# Patient Record
Sex: MALE | Race: ASIAN | NOT HISPANIC OR LATINO | URBAN - METROPOLITAN AREA
[De-identification: names, ages, dates, MRNs, and addresses within clinical notes are randomized per-mention and may not be internally consistent; named-entity substitution may affect disease eponyms.]

---

## 2019-12-14 ENCOUNTER — INPATIENT (INPATIENT)
Facility: HOSPITAL | Age: 54
LOS: 3 days | Discharge: HOME | DRG: 378 | End: 2019-12-18
Attending: INTERNAL MEDICINE | Admitting: INTERNAL MEDICINE
Payer: COMMERCIAL

## 2019-12-14 VITALS
OXYGEN SATURATION: 98 % | SYSTOLIC BLOOD PRESSURE: 138 MMHG | WEIGHT: 160.06 LBS | HEIGHT: 62 IN | RESPIRATION RATE: 18 BRPM | DIASTOLIC BLOOD PRESSURE: 86 MMHG | HEART RATE: 89 BPM | TEMPERATURE: 98 F

## 2019-12-14 DIAGNOSIS — K92.2 GASTROINTESTINAL HEMORRHAGE, UNSPECIFIED: ICD-10-CM

## 2019-12-14 LAB
ALBUMIN SERPL ELPH-MCNC: 4 G/DL — SIGNIFICANT CHANGE UP (ref 3.3–5)
ALP SERPL-CCNC: 69 U/L — SIGNIFICANT CHANGE UP (ref 40–120)
ALT FLD-CCNC: 22 U/L — SIGNIFICANT CHANGE UP (ref 10–45)
ANION GAP SERPL CALC-SCNC: 10 MMOL/L — SIGNIFICANT CHANGE UP (ref 5–17)
APPEARANCE UR: CLEAR — SIGNIFICANT CHANGE UP
APTT BLD: 34.3 SEC — SIGNIFICANT CHANGE UP (ref 27.5–36.3)
AST SERPL-CCNC: 16 U/L — SIGNIFICANT CHANGE UP (ref 10–40)
BACTERIA # UR AUTO: 0 — SIGNIFICANT CHANGE UP
BASOPHILS # BLD AUTO: 0.04 K/UL — SIGNIFICANT CHANGE UP (ref 0–0.2)
BASOPHILS NFR BLD AUTO: 0.5 % — SIGNIFICANT CHANGE UP (ref 0–2)
BILIRUB SERPL-MCNC: 0.7 MG/DL — SIGNIFICANT CHANGE UP (ref 0.2–1.2)
BILIRUB UR-MCNC: NEGATIVE — SIGNIFICANT CHANGE UP
BLD GP AB SCN SERPL QL: NEGATIVE — SIGNIFICANT CHANGE UP
BUN SERPL-MCNC: 24 MG/DL — HIGH (ref 7–23)
CALCIUM SERPL-MCNC: 8 MG/DL — LOW (ref 8.4–10.5)
CHLORIDE SERPL-SCNC: 105 MMOL/L — SIGNIFICANT CHANGE UP (ref 96–108)
CO2 SERPL-SCNC: 24 MMOL/L — SIGNIFICANT CHANGE UP (ref 22–31)
COLOR SPEC: SIGNIFICANT CHANGE UP
CREAT SERPL-MCNC: 0.59 MG/DL — SIGNIFICANT CHANGE UP (ref 0.5–1.3)
DIFF PNL FLD: NEGATIVE — SIGNIFICANT CHANGE UP
EOSINOPHIL # BLD AUTO: 0.33 K/UL — SIGNIFICANT CHANGE UP (ref 0–0.5)
EOSINOPHIL NFR BLD AUTO: 4 % — SIGNIFICANT CHANGE UP (ref 0–6)
EPI CELLS # UR: 0 /HPF — SIGNIFICANT CHANGE UP
GAS PNL BLDV: SIGNIFICANT CHANGE UP
GLUCOSE SERPL-MCNC: 128 MG/DL — HIGH (ref 70–99)
GLUCOSE UR QL: ABNORMAL
HCT VFR BLD CALC: 31.9 % — LOW (ref 39–50)
HGB BLD-MCNC: 10.8 G/DL — LOW (ref 13–17)
HYALINE CASTS # UR AUTO: 1 /LPF — SIGNIFICANT CHANGE UP (ref 0–2)
IMM GRANULOCYTES NFR BLD AUTO: 0.2 % — SIGNIFICANT CHANGE UP (ref 0–1.5)
INR BLD: 1.04 RATIO — SIGNIFICANT CHANGE UP (ref 0.88–1.16)
KETONES UR-MCNC: NEGATIVE — SIGNIFICANT CHANGE UP
LEUKOCYTE ESTERASE UR-ACNC: NEGATIVE — SIGNIFICANT CHANGE UP
LIDOCAIN IGE QN: 17 U/L — SIGNIFICANT CHANGE UP (ref 7–60)
LYMPHOCYTES # BLD AUTO: 1.21 K/UL — SIGNIFICANT CHANGE UP (ref 1–3.3)
LYMPHOCYTES # BLD AUTO: 14.7 % — SIGNIFICANT CHANGE UP (ref 13–44)
MCHC RBC-ENTMCNC: 29.1 PG — SIGNIFICANT CHANGE UP (ref 27–34)
MCHC RBC-ENTMCNC: 33.9 GM/DL — SIGNIFICANT CHANGE UP (ref 32–36)
MCV RBC AUTO: 86 FL — SIGNIFICANT CHANGE UP (ref 80–100)
MONOCYTES # BLD AUTO: 0.64 K/UL — SIGNIFICANT CHANGE UP (ref 0–0.9)
MONOCYTES NFR BLD AUTO: 7.8 % — SIGNIFICANT CHANGE UP (ref 2–14)
NEUTROPHILS # BLD AUTO: 6.01 K/UL — SIGNIFICANT CHANGE UP (ref 1.8–7.4)
NEUTROPHILS NFR BLD AUTO: 72.8 % — SIGNIFICANT CHANGE UP (ref 43–77)
NITRITE UR-MCNC: NEGATIVE — SIGNIFICANT CHANGE UP
NRBC # BLD: 0 /100 WBCS — SIGNIFICANT CHANGE UP (ref 0–0)
OB PNL STL: POSITIVE
PH UR: 6 — SIGNIFICANT CHANGE UP (ref 5–8)
PLATELET # BLD AUTO: 188 K/UL — SIGNIFICANT CHANGE UP (ref 150–400)
POTASSIUM SERPL-MCNC: 3.3 MMOL/L — LOW (ref 3.5–5.3)
POTASSIUM SERPL-SCNC: 3.3 MMOL/L — LOW (ref 3.5–5.3)
PROT SERPL-MCNC: 6.3 G/DL — SIGNIFICANT CHANGE UP (ref 6–8.3)
PROT UR-MCNC: NEGATIVE — SIGNIFICANT CHANGE UP
PROTHROM AB SERPL-ACNC: 12 SEC — SIGNIFICANT CHANGE UP (ref 10–12.9)
RBC # BLD: 3.71 M/UL — LOW (ref 4.2–5.8)
RBC # FLD: 11.6 % — SIGNIFICANT CHANGE UP (ref 10.3–14.5)
RBC CASTS # UR COMP ASSIST: 1 /HPF — SIGNIFICANT CHANGE UP (ref 0–4)
RH IG SCN BLD-IMP: POSITIVE — SIGNIFICANT CHANGE UP
RH IG SCN BLD-IMP: POSITIVE — SIGNIFICANT CHANGE UP
SODIUM SERPL-SCNC: 139 MMOL/L — SIGNIFICANT CHANGE UP (ref 135–145)
SP GR SPEC: 1.03 — HIGH (ref 1.01–1.02)
UROBILINOGEN FLD QL: NEGATIVE — SIGNIFICANT CHANGE UP
WBC # BLD: 8.25 K/UL — SIGNIFICANT CHANGE UP (ref 3.8–10.5)
WBC # FLD AUTO: 8.25 K/UL — SIGNIFICANT CHANGE UP (ref 3.8–10.5)
WBC UR QL: 1 /HPF — SIGNIFICANT CHANGE UP (ref 0–5)

## 2019-12-14 PROCEDURE — 74177 CT ABD & PELVIS W/CONTRAST: CPT | Mod: 26

## 2019-12-14 PROCEDURE — 93010 ELECTROCARDIOGRAM REPORT: CPT

## 2019-12-14 PROCEDURE — 99284 EMERGENCY DEPT VISIT MOD MDM: CPT

## 2019-12-14 RX ORDER — PANTOPRAZOLE SODIUM 20 MG/1
40 TABLET, DELAYED RELEASE ORAL DAILY
Refills: 0 | Status: DISCONTINUED | OUTPATIENT
Start: 2019-12-14 | End: 2019-12-16

## 2019-12-14 RX ORDER — LEVOTHYROXINE SODIUM 125 MCG
88 TABLET ORAL DAILY
Refills: 0 | Status: DISCONTINUED | OUTPATIENT
Start: 2019-12-14 | End: 2019-12-18

## 2019-12-14 RX ORDER — LEVOTHYROXINE SODIUM 125 MCG
1 TABLET ORAL
Qty: 0 | Refills: 0 | DISCHARGE

## 2019-12-14 RX ORDER — LEVOTHYROXINE SODIUM 125 MCG
88 TABLET ORAL DAILY
Refills: 0 | Status: DISCONTINUED | OUTPATIENT
Start: 2019-12-14 | End: 2019-12-14

## 2019-12-14 RX ORDER — PANTOPRAZOLE SODIUM 20 MG/1
80 TABLET, DELAYED RELEASE ORAL ONCE
Refills: 0 | Status: COMPLETED | OUTPATIENT
Start: 2019-12-14 | End: 2019-12-14

## 2019-12-14 RX ORDER — POTASSIUM CHLORIDE 20 MEQ
40 PACKET (EA) ORAL ONCE
Refills: 0 | Status: COMPLETED | OUTPATIENT
Start: 2019-12-14 | End: 2019-12-14

## 2019-12-14 RX ADMIN — PANTOPRAZOLE SODIUM 80 MILLIGRAM(S): 20 TABLET, DELAYED RELEASE ORAL at 14:14

## 2019-12-14 RX ADMIN — Medication 40 MILLIEQUIVALENT(S): at 14:14

## 2019-12-14 NOTE — H&P ADULT - ASSESSMENT
5 y/o Yi speaking male with PMHx hypothyroidism no PSx       in  ED c/o epigastric pain/ melena,  x3 days s/p EGD/C-scope 4 days ago. by  an outside  jose  dr  . Patient reported ,  dark tarry stools after procedure/intermittent epigastric sharp burning pain  . Per  pt,  EGD/Colonoscopy,  nprmal per  pt  hb of  10    prbc   serial hb   gi  dr preston martinez   iv Protonix   ct  abdomen, pending  hypokalemia   dvt ppx/ PAS

## 2019-12-14 NOTE — ED ADULT NURSE REASSESSMENT NOTE - NS ED NURSE REASSESS COMMENT FT1
Pt resting comfortably in bed with wife at bedside. Denies pain, discomfort, dizziness, or nausea. Will continue to reassess.

## 2019-12-14 NOTE — H&P ADULT - NSHPLABSRESULTS_GEN_ALL_CORE
LABS:                        10.8   8.25  )-----------( 188      ( 14 Dec 2019 13:10 )             31.9         139  |  105  |  24<H>  ----------------------------<  128<H>  3.3<L>   |  24  |  0.59    Ca    8.0<L>      14 Dec 2019 13:10    TPro  6.3  /  Alb  4.0  /  TBili  0.7  /  DBili  x   /  AST  16  /  ALT  22  /  AlkPhos  69  12-14    PT/INR - ( 14 Dec 2019 13:10 )   PT: 12.0 sec;   INR: 1.04 ratio         PTT - ( 14 Dec 2019 13:10 )  PTT:34.3 sec      Urinalysis Basic - ( 14 Dec 2019 14:55 )    Color: Light Yellow / Appearance: Clear / S.026 / pH: x  Gluc: x / Ketone: Negative  / Bili: Negative / Urobili: Negative   Blood: x / Protein: Negative / Nitrite: Negative   Leuk Esterase: Negative / RBC: 1 /hpf / WBC 1 /HPF   Sq Epi: x / Non Sq Epi: 0 /hpf / Bacteria: 0.0           @ 13:10  3.1  36

## 2019-12-14 NOTE — CONSULT NOTE ADULT - SUBJECTIVE AND OBJECTIVE BOX
Chief Complaint:  Patient is a 54y old  Male who presents with a chief complaint of melena 53 y/o Indonesian speaking male with PMHx hypothyroidism no PSx   now presenting to the ED c/o epigastric pain and melanous stools x3 days s/p EGD/C-scope 4 days ago  . Patient reported ,  dark tarry stools after the procedure and intermittent epigastric sharp burning pain  . Patient rated abdominal pain 7/10 with onset and well localized. Patient reported EGD/C-scope within normal limits. Patient denied CP, SOB, N/V/D, fever chills, back pain, neck pain, dizziness, palpitations, weakness, h/o GI bleed, pepto bismol use, increased NSAID use, ETOH use   . Patient seen by GI doctor yesterday who "prescribed one month course of medication" but unknown meds         Review of Systems:  Review of Systems: REVIEW OF SYSTEMS:  GEN: no fever,    no chills  RESP: no SOB,   no cough  CVS: no chest pain,   no palpitations  GI: no abdominal pain,   no nausea,   no vomiting,   no constipation,   no diarrhea  : no dysuria,   no frequency  NEURO: no headache,   no dizziness  PSYCH: no depression,   not anxious Derm : no rash	      Allergies:  No Known Allergies      Medications:  levothyroxine 88 MICROGram(s) Oral daily  pantoprazole  Injectable 40 milliGRAM(s) IV Push daily      PMHX/PSHX:  Hypothyroidism  No significant past surgical history      Family history:  no uc no cd     Social History: no etoh  no cigs no ivda    ROS:     General:  No wt loss, fevers, chills, night sweats, fatigue,   Eyes:  Good vision, no reported pain  ENT:  No sore throat, pain, runny nose, dysphagia  CV:  No pain, palpitations, hypo/hypertension  Resp:  No dyspnea, cough, tachypnea, wheezing  GI:  No pain, No nausea, No vomiting, No diarrhea, No constipation, No weight loss, No fever, No pruritis, No rectal bleeding, No tarry stools, No dysphagia,  :  No pain, bleeding, incontinence, nocturia  Muscle:  No pain, weakness  Neuro:  No weakness, tingling, memory problems  Psych:  No fatigue, insomnia, mood problems, depression  Endocrine:  No polyuria, polydipsia, cold/heat intolerance  Heme:  No petechiae, ecchymosis, easy bruisability  Skin:  No rash, tattoos, scars, edema      PHYSICAL EXAM:   Vital Signs:  Vital Signs Last 24 Hrs  T(C): 37 (14 Dec 2019 21:51), Max: 37 (14 Dec 2019 21:51)  T(F): 98.6 (14 Dec 2019 21:51), Max: 98.6 (14 Dec 2019 21:51)  HR: 68 (14 Dec 2019 21:51) (68 - 89)  BP: 103/65 (14 Dec 2019 21:51) (103/65 - 138/86)  BP(mean): --  RR: 18 (14 Dec 2019 21:51) (18 - 20)  SpO2: 98% (14 Dec 2019 21:51) (96% - 98%)  Daily Height in cm: 160.02 (14 Dec 2019 16:58)    Daily     GENERAL:  Appears stated age, well-groomed, well-nourished, no distress  HEENT:  NC/AT,  conjunctivae clear and pink, no thyromegaly, nodules, adenopathy, no JVD, sclera -anicteric  CHEST:  Full & symmetric excursion, no increased effort, breath sounds clear  HEART:  Regular rhythm, S1, S2, no murmur/rub/S3/S4, no abdominal bruit, no edema  ABDOMEN:  Soft, non-tender, non-distended, normoactive bowel sounds,  no masses ,no hepato-splenomegaly, no signs of chronic liver disease  EXTEREMITIES:  no cyanosis,clubbing or edema  SKIN:  No rash/erythema/ecchymoses/petechiae/wounds/abscess/warm/dry  NEURO:  Alert, oriented, no asterixis, no tremor, no encephalopathy    LABS:                        10.8   8.25  )-----------( 188      ( 14 Dec 2019 13:10 )             31.9     12-14    139  |  105  |  24<H>  ----------------------------<  128<H>  3.3<L>   |  24  |  0.59    Ca    8.0<L>      14 Dec 2019 13:10    TPro  6.3  /  Alb  4.0  /  TBili  0.7  /  DBili  x   /  AST  16  /  ALT  22  /  AlkPhos  69  12-14    LIVER FUNCTIONS - ( 14 Dec 2019 13:10 )  Alb: 4.0 g/dL / Pro: 6.3 g/dL / ALK PHOS: 69 U/L / ALT: 22 U/L / AST: 16 U/L / GGT: x           PT/INR - ( 14 Dec 2019 13:10 )   PT: 12.0 sec;   INR: 1.04 ratio         PTT - ( 14 Dec 2019 13:10 )  PTT:34.3 sec  Urinalysis Basic - ( 14 Dec 2019 14:55 )    Color: Light Yellow / Appearance: Clear / S.026 / pH: x  Gluc: x / Ketone: Negative  / Bili: Negative / Urobili: Negative   Blood: x / Protein: Negative / Nitrite: Negative   Leuk Esterase: Negative / RBC: 1 /hpf / WBC 1 /HPF   Sq Epi: x / Non Sq Epi: 0 /hpf / Bacteria: 0.0      Amylase Serum--      Lipase serum17       Ammonia--      Imaging:

## 2019-12-14 NOTE — ED PROVIDER NOTE - OBJECTIVE STATEMENT
55 y/o Greek speaking male with PMHx hypothyroidism no PSx now presenting to the ED c/o epigastric pain and melanous stools x3 days s/p EGD/C-scope 4 days ago. Patient reported watery dark tarry stools after the procedure and intermittent epigastric sharp burning pain. Patient rated abdominal pain 7/10 with onset and well localized. Patient reported EGD/C-scope within normal limits. Patient denied CP, SOB, N/V/D, fever chills, back pain, neck pain, dizziness, palpitations, weakness, h/o GI bleed, pepto bismol use, increased NSAID use, ETOH use . Patient seen by GI doctor yesterday who "prescribed one month course of medication" but unknown meds    Greek  Xiomara: [id number]

## 2019-12-14 NOTE — H&P ADULT - NSHPPHYSICALEXAM_GEN_ALL_CORE
PHYSICAL EXAMINATION:  Vital Signs Last 24 Hrs  T(C): 36.3 (14 Dec 2019 16:58), Max: 36.7 (14 Dec 2019 11:21)  T(F): 97.4 (14 Dec 2019 16:58), Max: 98 (14 Dec 2019 11:21)  HR: 71 (14 Dec 2019 16:58) (71 - 89)  BP: 106/64 (14 Dec 2019 16:58) (106/64 - 138/86)  BP(mean): --  RR: 18 (14 Dec 2019 16:58) (18 - 20)  SpO2: 97% (14 Dec 2019 16:58) (96% - 98%)  CAPILLARY BLOOD GLUCOSE            GENERAL: NAD, well-groomed,  HEAD:  atraumatic, normocephalic  EYES: sclera anicteric  ENMT: mucous membranes moist  NECK: supple, No JVD  CHEST/LUNG: clear to auscultation bilaterally;    no      rales   ,   no rhonchi,   HEART: normal S1, S2  ABDOMEN: BS+, soft, ND, NT   EXTREMITIES:    no    edema    b/l LEs  NEURO: awake, ,     moves all extremities  SKIN: no     rash

## 2019-12-14 NOTE — ED ADULT NURSE NOTE - OBJECTIVE STATEMENT
55 y/o male c/o melena since Thursday AM. Pt states he went for endoscopy and colonoscopy on Wednesday at 1700 because he has been getting chronic diarrhea after eating. Pt says he has sharp, burning epigastric pain. No significant PMH. Denies N/V/D, constipation, blood in urine, urinary symptoms, headache, chest pain, dizziness, SOB, weakness, syncope, fever, chills, cough. A&Ox4, breath sounds clear bilaterally, no abd distention or tenderness, no edema, ambulatory and able to move all extremities, skin warm dry and intact. IV placed and labs drawn. Fecal occult to be performed. Will continue to reassess. 55 y/o male c/o melena since Thursday AM. Pt states he went for endoscopy and colonoscopy on Wednesday at 1700 because he has been getting chronic diarrhea after eating. Pt says he has sharp, burning epigastric pain. PMH of hypothyroidism. Denies N/V/D, constipation, blood in urine, urinary symptoms, headache, chest pain, dizziness, SOB, weakness, syncope, fever, chills, cough. A&Ox4, breath sounds clear bilaterally, no abd distention or tenderness, no edema, ambulatory and able to move all extremities, skin warm dry and intact, skin color congruent w/ ethnicity. IV placed and labs drawn. Fecal occult to be performed. Will continue to reassess. 53 y/o male c/o black stools since Thursday AM. Pt states he went for endoscopy and colonoscopy on Wednesday at 1700 because he has been getting chronic diarrhea after eating. Pt says he has sharp, burning epigastric pain. PMH of hypothyroidism. Denies N/V/D, constipation, blood in urine, urinary symptoms, headache, chest pain, dizziness, SOB, weakness, syncope, fever, chills, cough. A&Ox4, breath sounds clear bilaterally, no abd distention or tenderness, no edema, ambulatory and able to move all extremities, skin warm dry and intact, skin color congruent w/ ethnicity. IV placed and labs drawn. Fecal occult to be performed. Will continue to reassess.

## 2019-12-14 NOTE — H&P ADULT - HISTORY OF PRESENT ILLNESS
55 y/o Welsh speaking male with PMHx hypothyroidism no PSx   now presenting to the ED c/o epigastric pain and melanous stools x3 days s/p EGD/C-scope 4 days ago  . Patient reported ,  dark tarry stools after the procedure and intermittent epigastric sharp burning pain  . Patient rated abdominal pain 7/10 with onset and well localized. Patient reported EGD/C-scope within normal limits. Patient denied CP, SOB, N/V/D, fever chills, back pain, neck pain, dizziness, palpitations, weakness, h/o GI bleed, pepto bismol use, increased NSAID use, ETOH use   . Patient seen by GI doctor yesterday who "prescribed one month course of medication" but unknown meds

## 2019-12-14 NOTE — ED ADULT NURSE NOTE - NSIMPLEMENTINTERV_GEN_ALL_ED
Implemented All Universal Safety Interventions:  [place] to call system. Call bell, personal items and telephone within reach. Instruct patient to call for assistance. Room bathroom lighting operational. Non-slip footwear when patient is off stretcher. Physically safe environment: no spills, clutter or unnecessary equipment. Stretcher in lowest position, wheels locked, appropriate side rails in place.

## 2019-12-14 NOTE — CONSULT NOTE ADULT - ASSESSMENT
ugib/lgib  s/p scopes    plan    ppi once a day   check cbc  trasnfujse as needed  clears for now   to follow if bleeding persists may need  upper gastrointestinal endoscopy to be done  to d/w medicine team

## 2019-12-15 LAB
ANION GAP SERPL CALC-SCNC: 12 MMOL/L — SIGNIFICANT CHANGE UP (ref 5–17)
BUN SERPL-MCNC: 17 MG/DL — SIGNIFICANT CHANGE UP (ref 7–23)
CALCIUM SERPL-MCNC: 8.1 MG/DL — LOW (ref 8.4–10.5)
CHLORIDE SERPL-SCNC: 101 MMOL/L — SIGNIFICANT CHANGE UP (ref 96–108)
CO2 SERPL-SCNC: 24 MMOL/L — SIGNIFICANT CHANGE UP (ref 22–31)
CREAT SERPL-MCNC: 0.65 MG/DL — SIGNIFICANT CHANGE UP (ref 0.5–1.3)
GLUCOSE SERPL-MCNC: 119 MG/DL — HIGH (ref 70–99)
HCT VFR BLD CALC: 31.1 % — LOW (ref 39–50)
HCT VFR BLD CALC: 31.6 % — LOW (ref 39–50)
HCT VFR BLD CALC: 31.8 % — LOW (ref 39–50)
HCT VFR BLD CALC: 33.4 % — LOW (ref 39–50)
HCV AB S/CO SERPL IA: 0.1 S/CO — SIGNIFICANT CHANGE UP (ref 0–0.99)
HCV AB SERPL-IMP: SIGNIFICANT CHANGE UP
HGB BLD-MCNC: 10.5 G/DL — LOW (ref 13–17)
HGB BLD-MCNC: 10.9 G/DL — LOW (ref 13–17)
HGB BLD-MCNC: 10.9 G/DL — LOW (ref 13–17)
HGB BLD-MCNC: 11.6 G/DL — LOW (ref 13–17)
MCHC RBC-ENTMCNC: 28.9 PG — SIGNIFICANT CHANGE UP (ref 27–34)
MCHC RBC-ENTMCNC: 29.3 PG — SIGNIFICANT CHANGE UP (ref 27–34)
MCHC RBC-ENTMCNC: 29.3 PG — SIGNIFICANT CHANGE UP (ref 27–34)
MCHC RBC-ENTMCNC: 29.7 PG — SIGNIFICANT CHANGE UP (ref 27–34)
MCHC RBC-ENTMCNC: 33.8 GM/DL — SIGNIFICANT CHANGE UP (ref 32–36)
MCHC RBC-ENTMCNC: 34.3 GM/DL — SIGNIFICANT CHANGE UP (ref 32–36)
MCHC RBC-ENTMCNC: 34.5 GM/DL — SIGNIFICANT CHANGE UP (ref 32–36)
MCHC RBC-ENTMCNC: 34.7 GM/DL — SIGNIFICANT CHANGE UP (ref 32–36)
MCV RBC AUTO: 84.9 FL — SIGNIFICANT CHANGE UP (ref 80–100)
MCV RBC AUTO: 85.5 FL — SIGNIFICANT CHANGE UP (ref 80–100)
MCV RBC AUTO: 85.6 FL — SIGNIFICANT CHANGE UP (ref 80–100)
MCV RBC AUTO: 85.7 FL — SIGNIFICANT CHANGE UP (ref 80–100)
NRBC # BLD: 0 /100 WBCS — SIGNIFICANT CHANGE UP (ref 0–0)
PLATELET # BLD AUTO: 172 K/UL — SIGNIFICANT CHANGE UP (ref 150–400)
PLATELET # BLD AUTO: 178 K/UL — SIGNIFICANT CHANGE UP (ref 150–400)
PLATELET # BLD AUTO: 181 K/UL — SIGNIFICANT CHANGE UP (ref 150–400)
PLATELET # BLD AUTO: 183 K/UL — SIGNIFICANT CHANGE UP (ref 150–400)
POTASSIUM SERPL-MCNC: 3.3 MMOL/L — LOW (ref 3.5–5.3)
POTASSIUM SERPL-SCNC: 3.3 MMOL/L — LOW (ref 3.5–5.3)
RBC # BLD: 3.63 M/UL — LOW (ref 4.2–5.8)
RBC # BLD: 3.72 M/UL — LOW (ref 4.2–5.8)
RBC # BLD: 3.72 M/UL — LOW (ref 4.2–5.8)
RBC # BLD: 3.9 M/UL — LOW (ref 4.2–5.8)
RBC # FLD: 12.2 % — SIGNIFICANT CHANGE UP (ref 10.3–14.5)
RBC # FLD: 12.3 % — SIGNIFICANT CHANGE UP (ref 10.3–14.5)
RBC # FLD: 12.4 % — SIGNIFICANT CHANGE UP (ref 10.3–14.5)
RBC # FLD: 12.8 % — SIGNIFICANT CHANGE UP (ref 10.3–14.5)
SODIUM SERPL-SCNC: 137 MMOL/L — SIGNIFICANT CHANGE UP (ref 135–145)
WBC # BLD: 10.2 K/UL — SIGNIFICANT CHANGE UP (ref 3.8–10.5)
WBC # BLD: 9.09 K/UL — SIGNIFICANT CHANGE UP (ref 3.8–10.5)
WBC # BLD: 9.37 K/UL — SIGNIFICANT CHANGE UP (ref 3.8–10.5)
WBC # BLD: 9.73 K/UL — SIGNIFICANT CHANGE UP (ref 3.8–10.5)
WBC # FLD AUTO: 10.2 K/UL — SIGNIFICANT CHANGE UP (ref 3.8–10.5)
WBC # FLD AUTO: 9.09 K/UL — SIGNIFICANT CHANGE UP (ref 3.8–10.5)
WBC # FLD AUTO: 9.37 K/UL — SIGNIFICANT CHANGE UP (ref 3.8–10.5)
WBC # FLD AUTO: 9.73 K/UL — SIGNIFICANT CHANGE UP (ref 3.8–10.5)

## 2019-12-15 RX ORDER — POTASSIUM CHLORIDE 20 MEQ
40 PACKET (EA) ORAL ONCE
Refills: 0 | Status: COMPLETED | OUTPATIENT
Start: 2019-12-15 | End: 2019-12-15

## 2019-12-15 RX ORDER — BENZOCAINE AND MENTHOL 5; 1 G/100ML; G/100ML
1 LIQUID ORAL ONCE
Refills: 0 | Status: COMPLETED | OUTPATIENT
Start: 2019-12-15 | End: 2019-12-15

## 2019-12-15 RX ADMIN — Medication 40 MILLIEQUIVALENT(S): at 11:53

## 2019-12-15 RX ADMIN — Medication 88 MICROGRAM(S): at 05:39

## 2019-12-15 RX ADMIN — BENZOCAINE AND MENTHOL 1 LOZENGE: 5; 1 LIQUID ORAL at 21:56

## 2019-12-15 RX ADMIN — PANTOPRAZOLE SODIUM 40 MILLIGRAM(S): 20 TABLET, DELAYED RELEASE ORAL at 11:53

## 2019-12-15 NOTE — PROGRESS NOTE ADULT - SUBJECTIVE AND OBJECTIVE BOX
INTERVAL HPI/OVERNIGHT EVENTS:  no melena old dark bm no pain    Allergies    No Known Allergies    Intolerances    General:  No wt loss, fevers, chills, night sweats, fatigue,   Eyes:  Good vision, no reported pain  ENT:  No sore throat, pain, runny nose, dysphagia  CV:  No pain, palpitations, hypo/hypertension  Resp:  No dyspnea, cough, tachypnea, wheezing  GI:  No pain, No nausea, No vomiting, No diarrhea, No constipation, No weight loss, No fever, No pruritis, No rectal bleeding, No tarry stools, No dysphagia,  :  No pain, bleeding, incontinence, nocturia  Muscle:  No pain, weakness  Neuro:  No weakness, tingling, memory problems  Psych:  No fatigue, insomnia, mood problems, depression  Endocrine:  No polyuria, polydipsia, cold/heat intolerance  Heme:  No petechiae, ecchymosis, easy bruisability  Skin:  No rash, tattoos, scars, edema      PHYSICAL EXAM:   Vital Signs:  Vital Signs Last 24 Hrs  T(C): 37 (15 Dec 2019 12:00), Max: 37 (14 Dec 2019 21:51)  T(F): 98.6 (15 Dec 2019 12:00), Max: 98.6 (14 Dec 2019 21:51)  HR: 84 (15 Dec 2019 12:00) (68 - 84)  BP: 110/69 (15 Dec 2019 12:00) (100/59 - 110/69)  BP(mean): --  RR: 18 (15 Dec 2019 12:00) (18 - 18)  SpO2: 97% (15 Dec 2019 12:00) (96% - 98%)  Daily Height in cm: 160.02 (14 Dec 2019 16:58)    Daily I&O's Summary    15 Dec 2019 07:01  -  15 Dec 2019 16:21  --------------------------------------------------------  IN: 480 mL / OUT: 0 mL / NET: 480 mL        GENERAL:  Appears stated age, well-groomed, well-nourished, no distress  HEENT:  NC/AT,  conjunctivae clear and pink, no thyromegaly, nodules, adenopathy, no JVD, sclera -anicteric  CHEST:  Full & symmetric excursion, no increased effort, breath sounds clear  HEART:  Regular rhythm, S1, S2, no murmur/rub/S3/S4, no abdominal bruit, no edema  ABDOMEN:  Soft, non-tender, non-distended, normoactive bowel sounds,  no masses ,no hepato-splenomegaly, no signs of chronic liver disease  EXTEREMITIES:  no cyanosis,clubbing or edema  SKIN:  No rash/erythema/ecchymoses/petechiae/wounds/abscess/warm/dry  NEURO:  Alert, oriented, no asterixis, no tremor, no encephalopathy      LABS:                        10.9   9.09  )-----------( 181      ( 15 Dec 2019 11:51 )             31.8     12-15    137  |  101  |  17  ----------------------------<  119<H>  3.3<L>   |  24  |  0.65    Ca    8.1<L>      15 Dec 2019 07:17    TPro  6.3  /  Alb  4.0  /  TBili  0.7  /  DBili  x   /  AST  16  /  ALT  22  /  AlkPhos  69  12-14    PT/INR - ( 14 Dec 2019 13:10 )   PT: 12.0 sec;   INR: 1.04 ratio         PTT - ( 14 Dec 2019 13:10 )  PTT:34.3 sec  Urinalysis Basic - ( 14 Dec 2019 14:55 )    Color: Light Yellow / Appearance: Clear / S.026 / pH: x  Gluc: x / Ketone: Negative  / Bili: Negative / Urobili: Negative   Blood: x / Protein: Negative / Nitrite: Negative   Leuk Esterase: Negative / RBC: 1 /hpf / WBC 1 /HPF   Sq Epi: x / Non Sq Epi: 0 /hpf / Bacteria: 0.0      amylase   lipaseLipase, Serum: 17 U/L ( @ 13:10)    RADIOLOGY & ADDITIONAL TESTS:

## 2019-12-15 NOTE — PROGRESS NOTE ADULT - SUBJECTIVE AND OBJECTIVE BOX
no melena  REVIEW OF SYSTEMS:  GEN: no fever,    no chills  RESP: no SOB,   no cough  CVS: no chest pain,   no palpitations  GI: no abdominal pain,   no nausea,   no vomiting,   no constipation,   no diarrhea  : no dysuria,   no frequency  NEURO: no headache,   no dizziness  PSYCH: no depression,   not anxious  Derm : no rash    MEDICATIONS  (STANDING):  levothyroxine 88 MICROGram(s) Oral daily  pantoprazole  Injectable 40 milliGRAM(s) IV Push daily  potassium chloride    Tablet ER 40 milliEquivalent(s) Oral once    MEDICATIONS  (PRN):      Vital Signs Last 24 Hrs  T(C): 37 (15 Dec 2019 04:45), Max: 37 (14 Dec 2019 21:51)  T(F): 98.6 (15 Dec 2019 04:45), Max: 98.6 (14 Dec 2019 21:51)  HR: 72 (15 Dec 2019 04:45) (68 - 89)  BP: 100/59 (15 Dec 2019 04:45) (100/59 - 138/86)  BP(mean): --  RR: 18 (15 Dec 2019 04:45) (18 - 20)  SpO2: 96% (15 Dec 2019 04:45) (96% - 98%)  CAPILLARY BLOOD GLUCOSE        I&O's Summary      PHYSICAL EXAM:  HEAD:  Atraumatic, Normocephalic  NECK: Supple, No   JVD  CHEST/LUNG:   no     rales,     no,    rhonchi  HEART: Regular rate and rhythm;         murmur  ABDOMEN: Soft, Nontender, ;   EXTREMITIES:     no   edema  NEUROLOGY:  alert    LABS:                        10.5   9.73  )-----------( 172      ( 15 Dec 2019 02:33 )             31.1     12-15    137  |  101  |  17  ----------------------------<  119<H>  3.3<L>   |  24  |  0.65    Ca    8.1<L>      15 Dec 2019 07:17    TPro  6.3  /  Alb  4.0  /  TBili  0.7  /  DBili  x   /  AST  16  /  ALT  22  /  AlkPhos  69  12-14    PT/INR - ( 14 Dec 2019 13:10 )   PT: 12.0 sec;   INR: 1.04 ratio         PTT - ( 14 Dec 2019 13:10 )  PTT:34.3 sec      Urinalysis Basic - ( 14 Dec 2019 14:55 )    Color: Light Yellow / Appearance: Clear / S.026 / pH: x  Gluc: x / Ketone: Negative  / Bili: Negative / Urobili: Negative   Blood: x / Protein: Negative / Nitrite: Negative   Leuk Esterase: Negative / RBC: 1 /hpf / WBC 1 /HPF   Sq Epi: x / Non Sq Epi: 0 /hpf / Bacteria: 0.0          12-14 @ 13:10  3.1  36              Consultant(s) Notes Reviewed:      Care Discussed with Consultants/Other Providers: melena.  earlier  REVIEW OF SYSTEMS:  GEN: no fever,    no chills  RESP: no SOB,   no cough  CVS: no chest pain,   no palpitations  GI: no abdominal pain,   no nausea,   no vomiting,   no constipation,   no diarrhea  : no dysuria,   no frequency  NEURO: no headache,   no dizziness  PSYCH: no depression,   not anxious  Derm : no rash    MEDICATIONS  (STANDING):  levothyroxine 88 MICROGram(s) Oral daily  pantoprazole  Injectable 40 milliGRAM(s) IV Push daily  potassium chloride    Tablet ER 40 milliEquivalent(s) Oral once    MEDICATIONS  (PRN):      Vital Signs Last 24 Hrs  T(C): 37 (15 Dec 2019 04:45), Max: 37 (14 Dec 2019 21:51)  T(F): 98.6 (15 Dec 2019 04:45), Max: 98.6 (14 Dec 2019 21:51)  HR: 72 (15 Dec 2019 04:45) (68 - 89)  BP: 100/59 (15 Dec 2019 04:45) (100/59 - 138/86)  BP(mean): --  RR: 18 (15 Dec 2019 04:45) (18 - 20)  SpO2: 96% (15 Dec 2019 04:45) (96% - 98%)  CAPILLARY BLOOD GLUCOSE        I&O's Summary      PHYSICAL EXAM:  HEAD:  Atraumatic, Normocephalic  NECK: Supple, No   JVD  CHEST/LUNG:   no     rales,     no,    rhonchi  HEART: Regular rate and rhythm;         murmur  ABDOMEN: Soft, Nontender, ;   EXTREMITIES:     no   edema  NEUROLOGY:  alert    LABS:                        10.5   9.73  )-----------( 172      ( 15 Dec 2019 02:33 )             31.1     12-15    137  |  101  |  17  ----------------------------<  119<H>  3.3<L>   |  24  |  0.65    Ca    8.1<L>      15 Dec 2019 07:17    TPro  6.3  /  Alb  4.0  /  TBili  0.7  /  DBili  x   /  AST  16  /  ALT  22  /  AlkPhos  69  12-14    PT/INR - ( 14 Dec 2019 13:10 )   PT: 12.0 sec;   INR: 1.04 ratio         PTT - ( 14 Dec 2019 13:10 )  PTT:34.3 sec      Urinalysis Basic - ( 14 Dec 2019 14:55 )    Color: Light Yellow / Appearance: Clear / S.026 / pH: x  Gluc: x / Ketone: Negative  / Bili: Negative / Urobili: Negative   Blood: x / Protein: Negative / Nitrite: Negative   Leuk Esterase: Negative / RBC: 1 /hpf / WBC 1 /HPF   Sq Epi: x / Non Sq Epi: 0 /hpf / Bacteria: 0.0          12-14 @ 13:10  3.1  36              Consultant(s) Notes Reviewed:      Care Discussed with Consultants/Other Providers:

## 2019-12-15 NOTE — CHART NOTE - NSCHARTNOTEFT_GEN_A_CORE
Called by Dr Abebe, pt had active bleed this am, will order additional unit of PRBC, continue to trend CBC, pt being followed by GI, likely plan for EGD/Colonoscopy in am

## 2019-12-15 NOTE — PROGRESS NOTE ADULT - ASSESSMENT
gi bleed  gerd    gerd precautions  in and out  ppi once a day  advance diet  d/c planning outpt follow up with pmd

## 2019-12-15 NOTE — PROGRESS NOTE ADULT - ASSESSMENT
5 y/o Welsh speaking male with PMHx hypothyroidism no PSx       in  ED c/o epigastric pain/ melena,  x3 days s/p EGD/C-scope 4 days ago. by  an outside  [place]  dr  Ken[place] Patient reported ,  dark tarry stools after procedure/intermittent epigastric sharp burning pain  . Per  pt,  EGD/Colonoscopy,  nprmal per  pt  hb of  10.  s/p prbc      serial hb   gi  dr preston martinez/  awiating egd   iv Protonix   ct  abdomen, prelim, no findings  hypokalemia repleted   dvt ppx/ PAS 3 y/o Faroese speaking male with PMHx hypothyroidism no PSx       in  ED c/o epigastric pain/ melena,  x3 days s/p EGD/C-scope 4 days ago. by  an outside  jose  dr  . Patient reported ,  dark tarry stools after procedure/intermittent epigastric sharp burning pain  . Per  pt,  EGD/Colonoscopy,  nprmal per  pt  hb of  10.  s/p prbc      serial hb   gi  dr preston martinez/  awiating egd   iv Protonix   ct  abdomen, prelim, no findings  pt and . family  with  pics on  their  phone,  of  melena  from this  am  hypokalemia repleted   dvt ppx/ PAS

## 2019-12-16 LAB
ANION GAP SERPL CALC-SCNC: 12 MMOL/L — SIGNIFICANT CHANGE UP (ref 5–17)
BUN SERPL-MCNC: 12 MG/DL — SIGNIFICANT CHANGE UP (ref 7–23)
CALCIUM SERPL-MCNC: 8.6 MG/DL — SIGNIFICANT CHANGE UP (ref 8.4–10.5)
CHLORIDE SERPL-SCNC: 99 MMOL/L — SIGNIFICANT CHANGE UP (ref 96–108)
CO2 SERPL-SCNC: 25 MMOL/L — SIGNIFICANT CHANGE UP (ref 22–31)
CREAT SERPL-MCNC: 0.74 MG/DL — SIGNIFICANT CHANGE UP (ref 0.5–1.3)
GLUCOSE SERPL-MCNC: 116 MG/DL — HIGH (ref 70–99)
HCT VFR BLD CALC: 36.1 % — LOW (ref 39–50)
HGB BLD-MCNC: 12.4 G/DL — LOW (ref 13–17)
MCHC RBC-ENTMCNC: 29.5 PG — SIGNIFICANT CHANGE UP (ref 27–34)
MCHC RBC-ENTMCNC: 34.3 GM/DL — SIGNIFICANT CHANGE UP (ref 32–36)
MCV RBC AUTO: 86 FL — SIGNIFICANT CHANGE UP (ref 80–100)
PLATELET # BLD AUTO: 199 K/UL — SIGNIFICANT CHANGE UP (ref 150–400)
POTASSIUM SERPL-MCNC: 3.7 MMOL/L — SIGNIFICANT CHANGE UP (ref 3.5–5.3)
POTASSIUM SERPL-SCNC: 3.7 MMOL/L — SIGNIFICANT CHANGE UP (ref 3.5–5.3)
RAPID RVP RESULT: DETECTED
RBC # BLD: 4.2 M/UL — SIGNIFICANT CHANGE UP (ref 4.2–5.8)
RBC # FLD: 12.7 % — SIGNIFICANT CHANGE UP (ref 10.3–14.5)
RV+EV RNA SPEC QL NAA+PROBE: DETECTED
SODIUM SERPL-SCNC: 136 MMOL/L — SIGNIFICANT CHANGE UP (ref 135–145)
WBC # BLD: 10.83 K/UL — HIGH (ref 3.8–10.5)
WBC # FLD AUTO: 10.83 K/UL — HIGH (ref 3.8–10.5)

## 2019-12-16 RX ORDER — PANTOPRAZOLE SODIUM 20 MG/1
40 TABLET, DELAYED RELEASE ORAL
Refills: 0 | Status: DISCONTINUED | OUTPATIENT
Start: 2019-12-16 | End: 2019-12-16

## 2019-12-16 RX ORDER — SIMETHICONE 80 MG/1
80 TABLET, CHEWABLE ORAL ONCE
Refills: 0 | Status: COMPLETED | OUTPATIENT
Start: 2019-12-16 | End: 2019-12-16

## 2019-12-16 RX ORDER — PANTOPRAZOLE SODIUM 20 MG/1
40 TABLET, DELAYED RELEASE ORAL EVERY 12 HOURS
Refills: 0 | Status: DISCONTINUED | OUTPATIENT
Start: 2019-12-16 | End: 2019-12-17

## 2019-12-16 RX ADMIN — Medication 88 MICROGRAM(S): at 05:43

## 2019-12-16 RX ADMIN — SIMETHICONE 80 MILLIGRAM(S): 80 TABLET, CHEWABLE ORAL at 21:09

## 2019-12-16 RX ADMIN — PANTOPRAZOLE SODIUM 40 MILLIGRAM(S): 20 TABLET, DELAYED RELEASE ORAL at 18:41

## 2019-12-16 NOTE — DISCHARGE NOTE PROVIDER - NSDCFUADDAPPT_GEN_ALL_CORE_FT
Please follow up with Gastroenterologist, Dr Florentino 1-2 weeks after discharge and to schedule a repeat upper endoscopy for 8 weeks.  Please follow up with your primary care physician 1-2 weeks after discharge.

## 2019-12-16 NOTE — PROGRESS NOTE ADULT - SUBJECTIVE AND OBJECTIVE BOX
no  melena  today/  no a bd pain    REVIEW OF SYSTEMS:  GEN: no fever,    no chills  RESP: no SOB,   no cough  CVS: no chest pain,   no palpitations  GI: no abdominal pain,   no nausea,   no vomiting,   no constipation,   no diarrhea  : no dysuria,   no frequency  NEURO: no headache,   no dizziness  PSYCH: no depression,   not anxious  Derm : no rash    MEDICATIONS  (STANDING):  levothyroxine 88 MICROGram(s) Oral daily  pantoprazole  Injectable 40 milliGRAM(s) IV Push daily    MEDICATIONS  (PRN):      Vital Signs Last 24 Hrs  T(C): 36.7 (16 Dec 2019 04:46), Max: 37 (15 Dec 2019 12:00)  T(F): 98.1 (16 Dec 2019 04:46), Max: 98.6 (15 Dec 2019 12:00)  HR: 70 (16 Dec 2019 04:46) (70 - 84)  BP: 106/67 (16 Dec 2019 04:46) (106/67 - 110/69)  BP(mean): --  RR: 17 (16 Dec 2019 04:46) (17 - 18)  SpO2: 97% (16 Dec 2019 04:46) (97% - 97%)  CAPILLARY BLOOD GLUCOSE        I&O's Summary    15 Dec 2019 07:01  -  16 Dec 2019 07:00  --------------------------------------------------------  IN: 730 mL / OUT: 400 mL / NET: 330 mL        PHYSICAL EXAM:  HEAD:  Atraumatic, Normocephalic  NECK: Supple, No   JVD  CHEST/LUNG:   no     rales,     no,    rhonchi  HEART: Regular rate and rhythm;         murmur  ABDOMEN: Soft, Nontender, ;   EXTREMITIES:    no    edema  NEUROLOGY:  alert    LABS:                        12.4   10.83 )-----------( 199      ( 16 Dec 2019 08:25 )             36.1         136  |  99  |  12  ----------------------------<  116<H>  3.7   |  25  |  0.74    Ca    8.6      16 Dec 2019 06:48    TPro  6.3  /  Alb  4.0  /  TBili  0.7  /  DBili  x   /  AST  16  /  ALT  22  /  AlkPhos  69  12-14    PT/INR - ( 14 Dec 2019 13:10 )   PT: 12.0 sec;   INR: 1.04 ratio         PTT - ( 14 Dec 2019 13:10 )  PTT:34.3 sec      Urinalysis Basic - ( 14 Dec 2019 14:55 )    Color: Light Yellow / Appearance: Clear / S.026 / pH: x  Gluc: x / Ketone: Negative  / Bili: Negative / Urobili: Negative   Blood: x / Protein: Negative / Nitrite: Negative   Leuk Esterase: Negative / RBC: 1 /hpf / WBC 1 /HPF   Sq Epi: x / Non Sq Epi: 0 /hpf / Bacteria: 0.0           @ 13:10  3.1  36              Consultant(s) Notes Reviewed:      Care Discussed with Consultants/Other Providers:

## 2019-12-16 NOTE — DISCHARGE NOTE PROVIDER - NSDCCPCAREPLAN_GEN_ALL_CORE_FT
PRINCIPAL DISCHARGE DIAGNOSIS  Diagnosis: GI bleed  Assessment and Plan of Treatment: There are 2 common types of GI Bleed, Upper GI Bleed and Lower GI Bleed.  Upper GI Bleed affects the esophagus, stomach, and first part of the small intestine. Lower GI Bleed affects the colon and rectum.  Upper GI Bleed signs and symptoms to notify your Health Care Provider are vomiting blood, or coffee ground vomitus, and bowel movements that look like black tar.  Lower GI Bleed signs and symptoms to notify your health care provider are bright red bloody bowel movements.   Take your medications as prescribed by your Gastroenterologist.  If you have had an Endoscopy or Colonoscopy, follow up with your Gastroenterologist for Pathology results.  Avoid NSAIDs unless your Health Care Provider tells you that it is ok (Aspirin, Ibuprofen, Advil, Motrin, Aleve).  Follow up with your Gastroenterologist within 1-2 weeks of discharge. PRINCIPAL DISCHARGE DIAGNOSIS  Diagnosis: GI bleed  Assessment and Plan of Treatment: Please follow up with GI Dr Velazquez 1-2 weeks after discharge, and to schedule a repeat upper endoscopy in 8 weeks.  There are 2 common types of GI Bleed, Upper GI Bleed and Lower GI Bleed.  Upper GI Bleed affects the esophagus, stomach, and first part of the small intestine. Lower GI Bleed affects the colon and rectum.  Upper GI Bleed signs and symptoms to notify your Health Care Provider are vomiting blood, or coffee ground vomitus, and bowel movements that look like black tar.  Lower GI Bleed signs and symptoms to notify your health care provider are bright red bloody bowel movements.   Take your medications as prescribed by your Gastroenterologist.  If you have had an Endoscopy or Colonoscopy, follow up with your Gastroenterologist for Pathology results.  Avoid NSAIDs unless your Health Care Provider tells you that it is ok (Aspirin, Ibuprofen, Advil, Motrin, Aleve).  Follow up with your Gastroenterologist within 1-2 weeks of discharge. PRINCIPAL DISCHARGE DIAGNOSIS  Diagnosis: GI bleed  Assessment and Plan of Treatment: There are 2 common types of GI Bleed, Upper GI Bleed and Lower GI Bleed.  Upper GI Bleed affects the esophagus, stomach, and first part of the small intestine. Lower GI Bleed affects the colon and rectum.  Upper GI Bleed signs and symptoms to notify your Health Care Provider are vomiting blood, or coffee ground vomitus, and bowel movements that look like black tar.  Lower GI Bleed signs and symptoms to notify your health care provider are bright red bloody bowel movements.   Take your medications as prescribed by your Gastroenterologist.  If you have had an Endoscopy or Colonoscopy, follow up with your Gastroenterologist for Pathology results.  Avoid NSAIDs unless your Health Care Provider tells you that it is ok (Aspirin, Ibuprofen, Advil, Motrin, Aleve).  Follow up with your Gastroenterologist within 1-2 weeks of discharge.        SECONDARY DISCHARGE DIAGNOSES  Diagnosis: Gastric ulcer  Assessment and Plan of Treatment: Continue medications as prescribed  Follow up with Gastroenterologist for repeat upper endoscopy in 8 weeks to check healing

## 2019-12-16 NOTE — DISCHARGE NOTE PROVIDER - HOSPITAL COURSE
53 y/o male with PMHx hypothyroidism no PSx, s/p EGD/C-scope 4 days prior to admission, presented to the ED c/o epigastric pain and melanous stools x3 days. Patient anemic, optimized with 2 units PRBC. GI consulted - EGD, non-bleeding ulcers; Recs - _+_________ Patient to follow up with GI for repeat EGD in 8 weeks.    DCP and medication reconsiliation discussed with Dr Hill. Patient is hemodynamically stable and cleared for discharge. 53 y/o male with PMHx hypothyroidism no PSx, s/p EGD/C-scope 4 days prior to admission, presented to the ED c/o epigastric pain and melanous stools x3 days. Patient anemic, optimized with 2 units PRBC. GI consulted - EGD, Non-bleeding gastric ulcers with a visible vessel. Clips (MR conditional) were placed. Patient to follow up with GI for repeat EGD in 8 weeks to check for healing.    DCP and medication reconciliation discussed with Dr Hill. Patient is hemodynamically stable and cleared for discharge.

## 2019-12-16 NOTE — DISCHARGE NOTE PROVIDER - CARE PROVIDER_API CALL
Tony Florentino (DO)  Gastroenterology; Internal Medicine  [address] [place] [address]  Phone: [phone]  Fax: [phone]  Follow Up Time: 2 weeks    Catrachita Booker  Phone: [phone]  Fax: (   )    -  Established Patient  Follow Up Time: 2 weeks

## 2019-12-16 NOTE — DISCHARGE NOTE PROVIDER - PROVIDER TOKENS
PROVIDER:[TOKEN:[[id number]],FOLLOWUP:[2 weeks]],FREE:[LAST:[Jhony],FIRST:[Hearim],PHONE:[phone]],FAX:[(   )    -],FOLLOWUP:[2 weeks],ESTABLISHEDPATIENT:[T]]

## 2019-12-16 NOTE — PROGRESS NOTE ADULT - SUBJECTIVE AND OBJECTIVE BOX
INTERVAL HPI/OVERNIGHT EVENTS:    pt seen and examined  + dark stool today  pt denies abdominal pain, n/v  s/p 1 unit prbc    MEDICATIONS  (STANDING):  levothyroxine 88 MICROGram(s) Oral daily  pantoprazole    Tablet 40 milliGRAM(s) Oral before breakfast    MEDICATIONS  (PRN):      Allergies    No Known Allergies    Intolerances        Review of Systems:    General:  No wt loss, fevers, chills, night sweats,fatigue,   Eyes:  Good vision, no reported pain  ENT:  No sore throat, pain, runny nose, dysphagia  CV:  No pain, palpitations, hypo/hypertension  Resp:  No dyspnea, cough, tachypnea, wheezing  GI:  No pain, No nausea, No vomiting, No diarrhea, No constipation, No weight loss, No fever, No pruritis, No rectal bleeding, + melena, No dysphagia  :  No pain, bleeding, incontinence, nocturia  Muscle:  No pain, weakness  Neuro:  No weakness, tingling, memory problems  Psych:  No fatigue, insomnia, mood problems, depression  Endocrine:  No polyuria, polydypsia, cold/heat intolerance  Heme:  No petechiae, ecchymosis, easy bruisability  Skin:  No rash, tattoos, scars, edema      Vital Signs Last 24 Hrs  T(C): 37 (16 Dec 2019 12:00), Max: 37 (16 Dec 2019 12:00)  T(F): 98.6 (16 Dec 2019 12:00), Max: 98.6 (16 Dec 2019 12:00)  HR: 84 (16 Dec 2019 12:00) (70 - 84)  BP: 103/68 (16 Dec 2019 12:00) (103/68 - 108/69)  BP(mean): --  RR: 17 (16 Dec 2019 12:00) (17 - 17)  SpO2: 98% (16 Dec 2019 12:00) (97% - 98%)    PHYSICAL EXAM:    Constitutional: NAD, well-developed  HEENT: EOMI, throat clear  Neck: No LAD, supple  Respiratory: CTA and P  Cardiovascular: S1 and S2, RRR, no M  Gastrointestinal: BS+, soft, NT/ND, neg HSM,  Extremities: No peripheral edema, neg clubbing, cyanosis  Vascular: 2+ peripheral pulses  Neurological: A/O x 3, no focal deficits  Psychiatric: Normal mood, normal affect  Skin: No rashes      LABS:                        12.4   10.83 )-----------( 199      ( 16 Dec 2019 08:25 )             36.1     12-16    136  |  99  |  12  ----------------------------<  116<H>  3.7   |  25  |  0.74    Ca    8.6      16 Dec 2019 06:48        Urinalysis Basic - ( 14 Dec 2019 14:55 )    Color: Light Yellow / Appearance: Clear / S.026 / pH: x  Gluc: x / Ketone: Negative  / Bili: Negative / Urobili: Negative   Blood: x / Protein: Negative / Nitrite: Negative   Leuk Esterase: Negative / RBC: 1 /hpf / WBC 1 /HPF   Sq Epi: x / Non Sq Epi: 0 /hpf / Bacteria: 0.0        RADIOLOGY & ADDITIONAL TESTS:

## 2019-12-16 NOTE — DISCHARGE NOTE NURSING/CASE MANAGEMENT/SOCIAL WORK - PATIENT PORTAL LINK FT
You can access the FollowMyHealth Patient Portal offered by [facility] by registering at the following website: [email or web address]. By joining [employer]’s FollowMyHealth portal, you will also be able to view your health information using other applications (apps) compatible with our system.

## 2019-12-16 NOTE — PROGRESS NOTE ADULT - ASSESSMENT
3 y/o Maori speaking male with PMHx hypothyroidism no PSx       in  ED c/o epigastric pain/ melena,  x3 days s/p EGD/C-scope 4 days ago. by  an outside  jose  dr  . Patient reported ,  dark tarry stools after procedure/intermittent epigastric sharp burning pain  . Per  pt,  EGD/Colonoscopy,  nprmal per  pt  hb of  10.  s/p prbc      serial hb   gi  dr preston martinez/  awiating egd    Protonix   ct  abdomen,  normal   dvt ppx/ PAS  pe r gi  note, no palns  for  egd    f/p a  s an op/    hb is  12/ 36, today /  no melena    advance diet  and  d/c later  pt with f/p with  gi, this  week

## 2019-12-16 NOTE — DISCHARGE NOTE PROVIDER - NSDCMRMEDTOKEN_GEN_ALL_CORE_FT
Synthroid 88 mcg (0.088 mg) oral tablet: 1 tab(s) orally once a day pantoprazole 40 mg oral delayed release tablet: 1 tab(s) orally 2 times a day MDD:2  Synthroid 88 mcg (0.088 mg) oral tablet: 1 tab(s) orally once a day

## 2019-12-17 LAB
ANION GAP SERPL CALC-SCNC: 13 MMOL/L — SIGNIFICANT CHANGE UP (ref 5–17)
BUN SERPL-MCNC: 12 MG/DL — SIGNIFICANT CHANGE UP (ref 7–23)
CALCIUM SERPL-MCNC: 8.6 MG/DL — SIGNIFICANT CHANGE UP (ref 8.4–10.5)
CHLORIDE SERPL-SCNC: 97 MMOL/L — SIGNIFICANT CHANGE UP (ref 96–108)
CO2 SERPL-SCNC: 27 MMOL/L — SIGNIFICANT CHANGE UP (ref 22–31)
CREAT SERPL-MCNC: 0.74 MG/DL — SIGNIFICANT CHANGE UP (ref 0.5–1.3)
GLUCOSE SERPL-MCNC: 125 MG/DL — HIGH (ref 70–99)
HCT VFR BLD CALC: 34.2 % — LOW (ref 39–50)
HGB BLD-MCNC: 11.7 G/DL — LOW (ref 13–17)
MCHC RBC-ENTMCNC: 29.5 PG — SIGNIFICANT CHANGE UP (ref 27–34)
MCHC RBC-ENTMCNC: 34.2 GM/DL — SIGNIFICANT CHANGE UP (ref 32–36)
MCV RBC AUTO: 86.4 FL — SIGNIFICANT CHANGE UP (ref 80–100)
PLATELET # BLD AUTO: 213 K/UL — SIGNIFICANT CHANGE UP (ref 150–400)
POTASSIUM SERPL-MCNC: 3.4 MMOL/L — LOW (ref 3.5–5.3)
POTASSIUM SERPL-SCNC: 3.4 MMOL/L — LOW (ref 3.5–5.3)
RBC # BLD: 3.96 M/UL — LOW (ref 4.2–5.8)
RBC # FLD: 12.7 % — SIGNIFICANT CHANGE UP (ref 10.3–14.5)
SODIUM SERPL-SCNC: 137 MMOL/L — SIGNIFICANT CHANGE UP (ref 135–145)
WBC # BLD: 7.38 K/UL — SIGNIFICANT CHANGE UP (ref 3.8–10.5)
WBC # FLD AUTO: 7.38 K/UL — SIGNIFICANT CHANGE UP (ref 3.8–10.5)

## 2019-12-17 RX ORDER — POTASSIUM CHLORIDE 20 MEQ
10 PACKET (EA) ORAL ONCE
Refills: 0 | Status: COMPLETED | OUTPATIENT
Start: 2019-12-17 | End: 2019-12-17

## 2019-12-17 RX ORDER — PANTOPRAZOLE SODIUM 20 MG/1
80 TABLET, DELAYED RELEASE ORAL EVERY 12 HOURS
Refills: 0 | Status: DISCONTINUED | OUTPATIENT
Start: 2019-12-17 | End: 2019-12-18

## 2019-12-17 RX ORDER — BENZOCAINE AND MENTHOL 5; 1 G/100ML; G/100ML
1 LIQUID ORAL ONCE
Refills: 0 | Status: COMPLETED | OUTPATIENT
Start: 2019-12-17 | End: 2019-12-17

## 2019-12-17 RX ADMIN — PANTOPRAZOLE SODIUM 40 MILLIGRAM(S): 20 TABLET, DELAYED RELEASE ORAL at 06:34

## 2019-12-17 RX ADMIN — Medication 88 MICROGRAM(S): at 06:34

## 2019-12-17 RX ADMIN — PANTOPRAZOLE SODIUM 80 MILLIGRAM(S): 20 TABLET, DELAYED RELEASE ORAL at 17:16

## 2019-12-17 RX ADMIN — BENZOCAINE AND MENTHOL 1 LOZENGE: 5; 1 LIQUID ORAL at 22:40

## 2019-12-17 RX ADMIN — Medication 100 MILLIEQUIVALENT(S): at 09:24

## 2019-12-17 NOTE — PROGRESS NOTE ADULT - ASSESSMENT
5 y/o Kyrgyz speaking male with PMHx hypothyroidism no PSx       in  ED c/o epigastric pain/ melena,  x3 days s/p EGD/C-scope 4 days ago. by  an outside  [place]  dr  Kenrick Patient reported ,  dark tarry stools after procedure/intermittent epigastric sharp burning pain  . Per  pt,  EGD/Colonoscopy,  nprmal per  pt    s/p prbc        gi  dr preston martinez/     Protonix   ct  abdomen,  normal   dvt ppx/ PAS  per  gi, now  awaiting egd    hb is  12/ 36,   no melena    if egd stable, then may  d/c pt  later, if  ok with gi

## 2019-12-17 NOTE — PROGRESS NOTE ADULT - SUBJECTIVE AND OBJECTIVE BOX
no  abd  pain  REVIEW OF SYSTEMS:  GEN: no fever,    no chills  RESP: no SOB,   no cough  CVS: no chest pain,   no palpitations  GI: no abdominal pain,   no nausea,   no vomiting,   no constipation,   no diarrhea  : no dysuria,   no frequency  NEURO: no headache,   no dizziness  PSYCH: no depression,   not anxious  Derm : no rash    MEDICATIONS  (STANDING):  levothyroxine 88 MICROGram(s) Oral daily  pantoprazole  Injectable 40 milliGRAM(s) IV Push every 12 hours    MEDICATIONS  (PRN):      Vital Signs Last 24 Hrs  T(C): 36.8 (17 Dec 2019 03:59), Max: 37 (16 Dec 2019 12:00)  T(F): 98.2 (17 Dec 2019 03:59), Max: 98.6 (16 Dec 2019 12:00)  HR: 66 (17 Dec 2019 03:59) (66 - 85)  BP: 103/67 (17 Dec 2019 03:59) (103/67 - 112/70)  BP(mean): --  RR: 18 (17 Dec 2019 03:59) (17 - 18)  SpO2: 97% (17 Dec 2019 03:59) (97% - 98%)  CAPILLARY BLOOD GLUCOSE        I&O's Summary    16 Dec 2019 07:01  -  17 Dec 2019 07:00  --------------------------------------------------------  IN: 240 mL / OUT: 0 mL / NET: 240 mL        PHYSICAL EXAM:  HEAD:  Atraumatic, Normocephalic  NECK: Supple, No   JVD  CHEST/LUNG:   no     rales,     no,    rhonchi  HEART: Regular rate and rhythm;         murmur  ABDOMEN: Soft, Nontender, ;   EXTREMITIES:    no   edema  NEUROLOGY:  alert    LABS:                        12.4   10.83 )-----------( 199      ( 16 Dec 2019 08:25 )             36.1     12-17    137  |  97  |  12  ----------------------------<  125<H>  3.4<L>   |  27  |  0.74    Ca    8.6      17 Dec 2019 07:08                              Consultant(s) Notes Reviewed:      Care Discussed with Consultants/Other Providers:

## 2019-12-17 NOTE — PROGRESS NOTE ADULT - SUBJECTIVE AND OBJECTIVE BOX
Pre-Endoscopy Evaluation      Referring Physician: dr. kwaku orosco                                    Procedure:  upper gastrointestinal endoscopy     Indication for Procedure: gib    Pertinent History: 54y old male with PMH of Hypothyroidism with melenic stools    Sedation by Anesthesia [x]    PAST MEDICAL & SURGICAL HISTORY:  Hypothyroidism  No significant past surgical history      PMH of Gastroparesis [ ]  Gastric Surgery [ ]  Gastric Outlet Obstruction [ ]    Allergies:    No Known Allergies    Intolerances;    Latex allergy: [ ] yes [X] no    Medications:MEDICATIONS  (STANDING):  levothyroxine 88 MICROGram(s) Oral daily  pantoprazole  Injectable 40 milliGRAM(s) IV Push every 12 hours  potassium chloride  10 mEq/100 mL IVPB 10 milliEquivalent(s) IV Intermittent once    MEDICATIONS  (PRN):      Smoking: [ ] yes  [X] no    AICD/PPM: [ ] yes   [X] no    Pertinent lab data:                        12.4   10.83 )-----------( 199      ( 16 Dec 2019 08:25 )             36.1     12-17    137  |  97  |  12  ----------------------------<  125<H>  3.4<L>   |  27  |  0.74    Ca    8.6      17 Dec 2019 07:08        Physical Examination:    Daily   Vital Signs Last 24 Hrs  T(C): 36.8 (17 Dec 2019 03:59), Max: 37 (16 Dec 2019 12:00)  T(F): 98.2 (17 Dec 2019 03:59), Max: 98.6 (16 Dec 2019 12:00)  HR: 66 (17 Dec 2019 03:59) (66 - 85)  BP: 103/67 (17 Dec 2019 03:59) (103/67 - 112/70)  BP(mean): --  RR: 18 (17 Dec 2019 03:59) (17 - 18)  SpO2: 97% (17 Dec 2019 03:59) (97% - 98%)    Drug Dosing Weight  Height (cm): 160 (14 Dec 2019 16:58)  Weight (kg): 70.2 (14 Dec 2019 16:58)  BMI (kg/m2): 27.4 (14 Dec 2019 16:58)  BSA (m2): 1.73 (14 Dec 2019 16:58)    Constitutional: NAD,       Neck:  No JVD    Respiratory: CTAB/L    Cardiovascular: S1 and S2    Gastrointestinal: BS+, soft, NT/ND    Extremities: No peripheral edema    Neurological: A/O x 3    : No Quijano    Skin: No rashes    Comments:    The patient is a suitable candidate for the planned procedure unless box checked [ ]  No, explain:

## 2019-12-18 VITALS
TEMPERATURE: 98 F | DIASTOLIC BLOOD PRESSURE: 68 MMHG | HEART RATE: 63 BPM | OXYGEN SATURATION: 97 % | RESPIRATION RATE: 18 BRPM | SYSTOLIC BLOOD PRESSURE: 107 MMHG

## 2019-12-18 LAB
ANION GAP SERPL CALC-SCNC: 10 MMOL/L — SIGNIFICANT CHANGE UP (ref 5–17)
BUN SERPL-MCNC: 15 MG/DL — SIGNIFICANT CHANGE UP (ref 7–23)
CALCIUM SERPL-MCNC: 8.2 MG/DL — LOW (ref 8.4–10.5)
CHLORIDE SERPL-SCNC: 100 MMOL/L — SIGNIFICANT CHANGE UP (ref 96–108)
CO2 SERPL-SCNC: 26 MMOL/L — SIGNIFICANT CHANGE UP (ref 22–31)
CREAT SERPL-MCNC: 0.9 MG/DL — SIGNIFICANT CHANGE UP (ref 0.5–1.3)
GLUCOSE SERPL-MCNC: 116 MG/DL — HIGH (ref 70–99)
HCT VFR BLD CALC: 32.9 % — LOW (ref 39–50)
HGB BLD-MCNC: 11.2 G/DL — LOW (ref 13–17)
MCHC RBC-ENTMCNC: 29.6 PG — SIGNIFICANT CHANGE UP (ref 27–34)
MCHC RBC-ENTMCNC: 34 GM/DL — SIGNIFICANT CHANGE UP (ref 32–36)
MCV RBC AUTO: 87 FL — SIGNIFICANT CHANGE UP (ref 80–100)
NRBC # BLD: 0 /100 WBCS — SIGNIFICANT CHANGE UP (ref 0–0)
PLATELET # BLD AUTO: 206 K/UL — SIGNIFICANT CHANGE UP (ref 150–400)
POTASSIUM SERPL-MCNC: 3.6 MMOL/L — SIGNIFICANT CHANGE UP (ref 3.5–5.3)
POTASSIUM SERPL-SCNC: 3.6 MMOL/L — SIGNIFICANT CHANGE UP (ref 3.5–5.3)
RBC # BLD: 3.78 M/UL — LOW (ref 4.2–5.8)
RBC # FLD: 12.6 % — SIGNIFICANT CHANGE UP (ref 10.3–14.5)
SODIUM SERPL-SCNC: 136 MMOL/L — SIGNIFICANT CHANGE UP (ref 135–145)
WBC # BLD: 9.83 K/UL — SIGNIFICANT CHANGE UP (ref 3.8–10.5)
WBC # FLD AUTO: 9.83 K/UL — SIGNIFICANT CHANGE UP (ref 3.8–10.5)

## 2019-12-18 PROCEDURE — 83690 ASSAY OF LIPASE: CPT

## 2019-12-18 PROCEDURE — 82330 ASSAY OF CALCIUM: CPT

## 2019-12-18 PROCEDURE — 96374 THER/PROPH/DIAG INJ IV PUSH: CPT | Mod: XU

## 2019-12-18 PROCEDURE — 85014 HEMATOCRIT: CPT

## 2019-12-18 PROCEDURE — 85610 PROTHROMBIN TIME: CPT

## 2019-12-18 PROCEDURE — 93005 ELECTROCARDIOGRAM TRACING: CPT

## 2019-12-18 PROCEDURE — P9016: CPT

## 2019-12-18 PROCEDURE — 87798 DETECT AGENT NOS DNA AMP: CPT

## 2019-12-18 PROCEDURE — 86900 BLOOD TYPING SEROLOGIC ABO: CPT

## 2019-12-18 PROCEDURE — 36430 TRANSFUSION BLD/BLD COMPNT: CPT

## 2019-12-18 PROCEDURE — 85027 COMPLETE CBC AUTOMATED: CPT

## 2019-12-18 PROCEDURE — 74177 CT ABD & PELVIS W/CONTRAST: CPT

## 2019-12-18 PROCEDURE — 80053 COMPREHEN METABOLIC PANEL: CPT

## 2019-12-18 PROCEDURE — 86923 COMPATIBILITY TEST ELECTRIC: CPT

## 2019-12-18 PROCEDURE — 82435 ASSAY OF BLOOD CHLORIDE: CPT

## 2019-12-18 PROCEDURE — 99285 EMERGENCY DEPT VISIT HI MDM: CPT | Mod: 25

## 2019-12-18 PROCEDURE — 81001 URINALYSIS AUTO W/SCOPE: CPT

## 2019-12-18 PROCEDURE — 87486 CHLMYD PNEUM DNA AMP PROBE: CPT

## 2019-12-18 PROCEDURE — 82272 OCCULT BLD FECES 1-3 TESTS: CPT

## 2019-12-18 PROCEDURE — 86803 HEPATITIS C AB TEST: CPT

## 2019-12-18 PROCEDURE — 87581 M.PNEUMON DNA AMP PROBE: CPT

## 2019-12-18 PROCEDURE — 84132 ASSAY OF SERUM POTASSIUM: CPT

## 2019-12-18 PROCEDURE — 83605 ASSAY OF LACTIC ACID: CPT

## 2019-12-18 PROCEDURE — 82947 ASSAY GLUCOSE BLOOD QUANT: CPT

## 2019-12-18 PROCEDURE — 80048 BASIC METABOLIC PNL TOTAL CA: CPT

## 2019-12-18 PROCEDURE — C1889: CPT

## 2019-12-18 PROCEDURE — 84295 ASSAY OF SERUM SODIUM: CPT

## 2019-12-18 PROCEDURE — 85730 THROMBOPLASTIN TIME PARTIAL: CPT

## 2019-12-18 PROCEDURE — 82803 BLOOD GASES ANY COMBINATION: CPT

## 2019-12-18 PROCEDURE — 86850 RBC ANTIBODY SCREEN: CPT

## 2019-12-18 PROCEDURE — 86901 BLOOD TYPING SEROLOGIC RH(D): CPT

## 2019-12-18 PROCEDURE — 87633 RESP VIRUS 12-25 TARGETS: CPT

## 2019-12-18 RX ORDER — PANTOPRAZOLE SODIUM 20 MG/1
1 TABLET, DELAYED RELEASE ORAL
Qty: 60 | Refills: 0
Start: 2019-12-18 | End: 2020-01-16

## 2019-12-18 RX ORDER — PANTOPRAZOLE SODIUM 20 MG/1
40 TABLET, DELAYED RELEASE ORAL
Refills: 0 | Status: DISCONTINUED | OUTPATIENT
Start: 2019-12-18 | End: 2019-12-18

## 2019-12-18 RX ADMIN — PANTOPRAZOLE SODIUM 80 MILLIGRAM(S): 20 TABLET, DELAYED RELEASE ORAL at 05:05

## 2019-12-18 RX ADMIN — Medication 88 MICROGRAM(S): at 05:05

## 2019-12-18 NOTE — PROGRESS NOTE ADULT - ASSESSMENT
5 y/o Mongolian speaking male with PMHx hypothyroidism no PSx       in  ED c/o epigastric pain/ melena,  x3 days s/p EGD/C-scope 4 days ago. by  an outside  [place]  dr  Kenrick Patient reported ,  dark tarry stools after procedure/intermittent epigastric sharp burning pain  . Per  pt,  EGD/Colonoscopy,  nprmal per  pt    s/p prbc        gi  dr preston martinez/     Protonix   ct  abdomen,  normal   dvt ppx/ PAS  egd.  with gastric  ulcrers    hb is  11  no melena    d/c today  f/p with gi,  1  week

## 2019-12-18 NOTE — PROGRESS NOTE ADULT - SUBJECTIVE AND OBJECTIVE BOX
no  melena    REVIEW OF SYSTEMS:  GEN: no fever,    no chills  RESP: no SOB,   no cough  CVS: no chest pain,   no palpitations  GI: no abdominal pain,   no nausea,   no vomiting,   no constipation,   no diarrhea  : no dysuria,   no frequency  NEURO: no headache,   no dizziness  PSYCH: no depression,   not anxious  Derm : no rash    MEDICATIONS  (STANDING):  levothyroxine 88 MICROGram(s) Oral daily  pantoprazole  Injectable 80 milliGRAM(s) IV Push every 12 hours    MEDICATIONS  (PRN):      Vital Signs Last 24 Hrs  T(C): 36.6 (18 Dec 2019 04:14), Max: 36.8 (17 Dec 2019 09:30)  T(F): 97.9 (18 Dec 2019 04:14), Max: 98.2 (17 Dec 2019 09:30)  HR: 63 (18 Dec 2019 04:14) (63 - 85)  BP: 107/68 (18 Dec 2019 04:14) (100/62 - 120/76)  BP(mean): --  RR: 18 (18 Dec 2019 04:14) (18 - 18)  SpO2: 97% (18 Dec 2019 04:14) (96% - 98%)  CAPILLARY BLOOD GLUCOSE        I&O's Summary    17 Dec 2019 07:01  -  18 Dec 2019 07:00  --------------------------------------------------------  IN: 440 mL / OUT: 0 mL / NET: 440 mL        PHYSICAL EXAM:  HEAD:  Atraumatic, Normocephalic  NECK: Supple, No   JVD  CHEST/LUNG:   no     rales,     no,    rhonchi  HEART: Regular rate and rhythm;         murmur  ABDOMEN: Soft, Nontender, ;   EXTREMITIES:     no   edema  NEUROLOGY:  alert    LABS:                        11.2   9.83  )-----------( 206      ( 18 Dec 2019 06:44 )             32.9     12-18    136  |  100  |  15  ----------------------------<  116<H>  3.6   |  26  |  0.90    Ca    8.2<L>      18 Dec 2019 06:40                              Consultant(s) Notes Reviewed:      Care Discussed with Consultants/Other Providers:

## 2024-12-23 NOTE — ED PROVIDER NOTE - CPE EDP SKIN NORM
Start Time: 9:00 AM  End Time: 10:30 AM  Number of Participants: 5  Type of Group: Psychotherapy  Mode of Intervention: Education, Support, Socialization, Exploration, Clarifying, Problem-solving, Confrontation, Limit-setting, and Reality-testing  Topic: Behavioral Changes  Objective: Explore behavioral changes that need to be made to avoid a return to relapse.  Note:  The client actively participated in the group session today. He reported that he did not attend a sober support group meeting prior to this session and has been advised to re-engage with the recovery community for additional support. The session focused on identifying and discussing the behavioral changes necessary to help prevent relapse. The client verbalized that he needs to make significant changes in his recovery process, particularly regarding peers, environments, and personal goals.  The client acknowledged that past poor choices, including involvement in unhealthy peers relationships, not asking for help, and lack of active participation in his recovery, contributed to his relapse. Although the client demonstrated strong self-awareness of the behavioral changes required to aid his relapse prevention, he has not yet shown actionable steps to implement these changes in his life. Further work is needed in setting goals and making practical adjustments in his recovery approach.  Continued support and reinforcement of recovery strategies are recommended.The client actively participated in the group session today but presented as tired and unwell. He reported feeling sick and towards the end of the session , he left the group to go to the bathroom. The client returned and informed the counselor that he had been vomiting. Given his condition, the counselor permitted the client to leave the group session early.      Status after intervention:Unchanged  Participation level: Active Listener, Interactive, and Minimal  Participation Quality: Appropriate, 
normal...